# Patient Record
Sex: FEMALE | URBAN - METROPOLITAN AREA
[De-identification: names, ages, dates, MRNs, and addresses within clinical notes are randomized per-mention and may not be internally consistent; named-entity substitution may affect disease eponyms.]

---

## 2024-10-23 ENCOUNTER — TELEPHONE (OUTPATIENT)
Dept: TRANSPLANT | Facility: CLINIC | Age: 69
End: 2024-10-23

## 2024-10-24 ENCOUNTER — TELEPHONE (OUTPATIENT)
Dept: TRANSPLANT | Facility: CLINIC | Age: 69
End: 2024-10-24

## 2024-10-30 ENCOUNTER — TELEPHONE (OUTPATIENT)
Dept: TRANSPLANT | Facility: CLINIC | Age: 69
End: 2024-10-30
Payer: MEDICARE

## 2024-11-26 ENCOUNTER — TELEPHONE (OUTPATIENT)
Dept: TRANSPLANT | Facility: CLINIC | Age: 69
End: 2024-11-26
Payer: MEDICARE

## 2024-12-09 ENCOUNTER — TELEPHONE (OUTPATIENT)
Dept: TRANSPLANT | Facility: CLINIC | Age: 69
End: 2024-12-09
Payer: MEDICARE

## 2024-12-16 ENCOUNTER — TELEPHONE (OUTPATIENT)
Dept: TRANSPLANT | Facility: CLINIC | Age: 69
End: 2024-12-16
Payer: MEDICARE

## 2024-12-20 ENCOUNTER — TELEPHONE (OUTPATIENT)
Dept: TRANSPLANT | Facility: CLINIC | Age: 69
End: 2024-12-20
Payer: MEDICARE

## 2024-12-30 DIAGNOSIS — Z76.82 ORGAN TRANSPLANT CANDIDATE: ICD-10-CM

## 2024-12-30 DIAGNOSIS — N18.6 END STAGE RENAL DISEASE: Primary | ICD-10-CM

## 2025-01-23 ENCOUNTER — TELEPHONE (OUTPATIENT)
Dept: TRANSPLANT | Facility: CLINIC | Age: 70
End: 2025-01-23
Payer: MEDICARE

## 2025-02-05 ENCOUNTER — TELEPHONE (OUTPATIENT)
Dept: TRANSPLANT | Facility: CLINIC | Age: 70
End: 2025-02-05
Payer: MEDICARE

## 2025-02-06 ENCOUNTER — HOSPITAL ENCOUNTER (OUTPATIENT)
Dept: RADIOLOGY | Facility: HOSPITAL | Age: 70
Discharge: HOME | End: 2025-02-06
Attending: FAMILY MEDICINE
Payer: MEDICARE

## 2025-02-06 ENCOUNTER — HOSPITAL ENCOUNTER (OUTPATIENT)
Dept: RADIOLOGY | Facility: HOSPITAL | Age: 70
Discharge: HOME | End: 2025-02-06
Payer: MEDICARE

## 2025-02-06 ENCOUNTER — OFFICE VISIT (OUTPATIENT)
Dept: TRANSPLANT | Facility: CLINIC | Age: 70
End: 2025-02-06
Payer: MEDICARE

## 2025-02-06 VITALS
DIASTOLIC BLOOD PRESSURE: 90 MMHG | RESPIRATION RATE: 18 BRPM | HEART RATE: 53 BPM | TEMPERATURE: 98 F | BODY MASS INDEX: 28.79 KG/M2 | OXYGEN SATURATION: 97 % | WEIGHT: 168.63 LBS | HEIGHT: 64 IN | SYSTOLIC BLOOD PRESSURE: 186 MMHG

## 2025-02-06 DIAGNOSIS — N26.1 ATROPHIC KIDNEY: ICD-10-CM

## 2025-02-06 DIAGNOSIS — I10 ESSENTIAL HYPERTENSION: ICD-10-CM

## 2025-02-06 DIAGNOSIS — N18.6 END STAGE RENAL DISEASE: ICD-10-CM

## 2025-02-06 DIAGNOSIS — Z76.82 ORGAN TRANSPLANT CANDIDATE: ICD-10-CM

## 2025-02-06 DIAGNOSIS — Z01.818 PRE-TRANSPLANT EVALUATION FOR KIDNEY TRANSPLANT: Primary | ICD-10-CM

## 2025-02-06 DIAGNOSIS — F17.210 CIGARETTE NICOTINE DEPENDENCE WITHOUT COMPLICATION: ICD-10-CM

## 2025-02-06 DIAGNOSIS — N18.5 CKD (CHRONIC KIDNEY DISEASE), STAGE V: ICD-10-CM

## 2025-02-06 PROBLEM — N25.81 SECONDARY HYPERPARATHYROIDISM: Status: ACTIVE | Noted: 2023-04-18

## 2025-02-06 PROBLEM — F17.200 NICOTINE DEPENDENCE: Status: ACTIVE | Noted: 2025-01-28

## 2025-02-06 PROCEDURE — 93978 VASCULAR STUDY: CPT | Mod: TC,TXP

## 2025-02-06 PROCEDURE — 76700 US EXAM ABDOM COMPLETE: CPT | Mod: TC,TXP

## 2025-02-06 PROCEDURE — 99999 PR PBB SHADOW E&M-EST. PATIENT-LVL V: CPT | Mod: PBBFAC,TXP,, | Performed by: NURSE PRACTITIONER

## 2025-02-06 PROCEDURE — 71046 X-RAY EXAM CHEST 2 VIEWS: CPT | Mod: 26,TXP,, | Performed by: RADIOLOGY

## 2025-02-06 PROCEDURE — 72192 CT PELVIS W/O DYE: CPT | Mod: TC,TXP

## 2025-02-06 PROCEDURE — 99204 OFFICE O/P NEW MOD 45 MIN: CPT | Mod: S$GLB,TXP,, | Performed by: PHYSICIAN ASSISTANT

## 2025-02-06 PROCEDURE — 76700 US EXAM ABDOM COMPLETE: CPT | Mod: 26,TXP,, | Performed by: RADIOLOGY

## 2025-02-06 PROCEDURE — 76770 US EXAM ABDO BACK WALL COMP: CPT | Mod: TC,TXP

## 2025-02-06 PROCEDURE — 72192 CT PELVIS W/O DYE: CPT | Mod: 26,TXP,, | Performed by: RADIOLOGY

## 2025-02-06 PROCEDURE — 99204 OFFICE O/P NEW MOD 45 MIN: CPT | Mod: S$GLB,TXP,, | Performed by: TRANSPLANT SURGERY

## 2025-02-06 PROCEDURE — 76770 US EXAM ABDO BACK WALL COMP: CPT | Mod: 26,TXP,, | Performed by: RADIOLOGY

## 2025-02-06 PROCEDURE — 93978 VASCULAR STUDY: CPT | Mod: 26,TXP,, | Performed by: RADIOLOGY

## 2025-02-06 PROCEDURE — 71046 X-RAY EXAM CHEST 2 VIEWS: CPT | Mod: TC,TXP

## 2025-02-06 RX ORDER — GABAPENTIN 300 MG/1
300 CAPSULE ORAL DAILY
COMMUNITY
Start: 2025-01-29

## 2025-02-06 RX ORDER — FUROSEMIDE 40 MG/1
40 TABLET ORAL DAILY PRN
COMMUNITY
Start: 2025-02-04

## 2025-02-06 RX ORDER — ASPIRIN 81 MG/1
1 TABLET ORAL DAILY
COMMUNITY

## 2025-02-06 RX ORDER — ACETAMINOPHEN 500 MG
500 TABLET ORAL
COMMUNITY

## 2025-02-06 RX ORDER — SODIUM BICARBONATE 650 MG/1
650 TABLET ORAL 2 TIMES DAILY
COMMUNITY
Start: 2025-01-25

## 2025-02-06 RX ORDER — CARVEDILOL 6.25 MG/1
1 TABLET ORAL 2 TIMES DAILY
COMMUNITY
Start: 2025-01-28

## 2025-02-06 RX ORDER — ALLOPURINOL 100 MG/1
2 TABLET ORAL DAILY
COMMUNITY
Start: 2025-01-28

## 2025-02-06 RX ORDER — HYDRALAZINE HYDROCHLORIDE 25 MG/1
25 TABLET, FILM COATED ORAL EVERY 12 HOURS
COMMUNITY
Start: 2024-10-16

## 2025-02-06 RX ORDER — LORATADINE 10 MG/1
10 TABLET ORAL DAILY PRN
COMMUNITY

## 2025-02-06 RX ORDER — ACYCLOVIR 800 MG/1
400 TABLET ORAL 3 TIMES DAILY
COMMUNITY
Start: 2024-10-23

## 2025-02-06 RX ORDER — PHENAZOPYRIDINE HYDROCHLORIDE 95 MG/1
95 TABLET ORAL 3 TIMES DAILY PRN
COMMUNITY

## 2025-02-06 RX ORDER — ACYCLOVIR 400 MG/1
400 TABLET ORAL 2 TIMES DAILY
COMMUNITY
Start: 2024-07-29

## 2025-02-06 RX ORDER — BUPROPION HYDROCHLORIDE 150 MG/1
150 TABLET, FILM COATED ORAL DAILY
COMMUNITY
Start: 2025-01-29

## 2025-02-06 RX ORDER — LOSARTAN POTASSIUM 25 MG/1
25 TABLET ORAL 2 TIMES DAILY
COMMUNITY
Start: 2024-10-16

## 2025-02-06 RX ORDER — LOPERAMIDE HYDROCHLORIDE 2 MG/1
2 CAPSULE ORAL DAILY
COMMUNITY

## 2025-02-06 RX ORDER — CALCITRIOL 0.25 UG/1
0.25 CAPSULE ORAL DAILY
COMMUNITY
Start: 2024-10-23

## 2025-02-06 RX ORDER — ATORVASTATIN CALCIUM 10 MG/1
10 TABLET, FILM COATED ORAL DAILY
COMMUNITY
Start: 2025-01-29

## 2025-02-06 NOTE — PROGRESS NOTES
PRE-TRANSPLANT INFECTIOUS DISEASE CONSULT    Reason for Visit:  Pre-transplant evaluation  Referring Provider: Dr. Smith Attending     History of Present Illness:    70 y.o. female with a history of kidney disease presents for pre-kidney transplant evaluation. Patient is pre-dialysis.    Infectious History:  Recent hospital admissions: No  Recent infections: Yes  Recent or current antibiotic use: Yes - UTI 3 weeks ago  History of recurrent infections *(sinus / pneumonia / UTI / SBP): UTIs twice a year  History of diabetic foot wound or bone/joint infection: No  Recent dental infections, issues or procedures: No  History of chicken pox: Yes  History of shingles: No  History of STI: Yes - herpes - taking acyclovir  History of COVID infection: No  HCV cured with harvoni 2018    History of Immunosuppression:  Prior chemotherapy / immunosuppression: No  Prior transplant: No  History of splenectomy: No    Tuberculosis:  Prior screening for latent TB: No  Prior diagnosis of latent TB: No  Risk factors for TB *known exposure, incarceration, homelessness*: No    Geographical exposures:  Currently lives in [place], [place] with partner  Lived in the following states: [place], [place], [place]  Lived or travelled to the [facility] [place]: No  International travel: Yes - [place]   Travel-associated illness: Yes    Social/Environmental:  Occupation: Retired. 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  Yes  Tobacco: Yes  Alcohol: Yes  Recreational drug use:  No  Sexual partners: Yes      Past Histories:   Past Medical History:   Diagnosis Date    Acidosis     Allergy     Anemia     Atrophic kidney     Depression     Gout, unspecified     Hepatitis C virus infection cured after antiviral drug therapy     Hyperlipidemia     Hypertension     Proteinuria     Secondary  hyperparathyroidism     Tobacco dependence      Past Surgical History:   Procedure Laterality Date    COLONOSCOPY      ESOPHAGOGASTRODUODENOSCOPY      TUBAL LIGATION       Family History   Problem Relation Name Age of Onset    Hypertension Mother      Diabetes Mother      Heart disease Mother      Kidney disease Mother      Heart disease Father      Cancer Father       Social History     Tobacco Use    Smoking status: Every Day     Current packs/day: 0.50     Types: Cigarettes    Smokeless tobacco: Never   Substance Use Topics    Alcohol use: Yes     Comment: 1 drink daily    Drug use: Never     Review of patient's allergies indicates:  No Known Allergies      Current antibiotics:  Antibiotics (From admission, onward)      None              Review of Systems  Review of Systems   Constitutional: Negative for chills, fever, malaise/fatigue and night sweats.   HENT:  Negative for congestion and sore throat.    Eyes:  Negative for blurred vision and visual disturbance.   Cardiovascular:  Negative for chest pain and leg swelling.   Respiratory:  Negative for cough, shortness of breath and sputum production.    Skin:  Negative for color change, dry skin and itching.   Musculoskeletal:  Negative for back pain, joint pain and joint swelling.   Gastrointestinal:  Negative for abdominal pain, diarrhea, heartburn, nausea and vomiting.   Genitourinary:  Negative for dysuria, flank pain and hematuria.   Neurological:  Negative for dizziness, numbness and weakness.   Psychiatric/Behavioral:  Negative for altered mental status and depression. The patient is not nervous/anxious.           Objective  Physical Exam  Constitutional:       General: She is not in acute distress.     Appearance: Normal appearance. She is well-developed. She is not ill-appearing or diaphoretic.   HENT:      Head: Normocephalic and atraumatic.      Right Ear: External ear normal.      Left Ear: External ear normal.      Nose: Nose normal.   Eyes:       "General: No scleral icterus.        Right eye: No discharge.         Left eye: No discharge.      Extraocular Movements: Extraocular movements intact.      Conjunctiva/sclera: Conjunctivae normal.   Pulmonary:      Effort: Pulmonary effort is normal. No respiratory distress.      Breath sounds: No stridor.   Musculoskeletal:         General: Normal range of motion.   Skin:     Findings: No erythema or rash.   Neurological:      General: No focal deficit present.      Mental Status: She is alert and oriented to person, place, and time. Mental status is at baseline.      Cranial Nerves: No cranial nerve deficit.   Psychiatric:         Mood and Affect: Mood normal.         Behavior: Behavior normal.         Thought Content: Thought content normal.         Judgment: Judgment normal.           Labs:    CBC:   Lab Results   Component Value Date    WBC 9.42 02/06/2025    HGB 12.9 02/06/2025    HCT 39.4 02/06/2025     (H) 02/06/2025     02/06/2025    GRAN 6.4 02/06/2025    GRAN 68.3 02/06/2025    LYMPH 1.8 02/06/2025    LYMPH 19.5 02/06/2025    MONO 0.7 02/06/2025    MONO 6.9 02/06/2025    EOSINOPHIL 4.1 02/06/2025       Syphilis screening:   Lab Results   Component Value Date    TREPABIGMIGG Nonreactive 02/06/2025        TB screening: No results found for: "TBGOLDPLUS", "TSPOTSCREN"    HIV screening:   Lab Results   Component Value Date    ZXA41HUCA Non-reactive 02/06/2025       Strongyloides IgG: No results found for: "STRONGANTIGG"    Hepatitis Serologies:   Lab Results   Component Value Date    HEPAIGG Non-reactive 02/06/2025    HEPBCAB Non-reactive 02/06/2025    HEPBSAB <3.00 02/06/2025    HEPBSAB Non-reactive 02/06/2025    HEPCAB Reactive (A) 02/06/2025        Varicella IgG: No results found for: "VARICELLAINT"    Immunization History:  Received all childhood vaccines: Yes  All household members receive annual flu vaccine: No  All household members are up to date on COVID vaccine: No  Immunization " History   Administered Date(s) Administered    COVID-19 Vaccine 05/10/2021, 11/18/2021    Influenza 12/03/2018, 01/08/2020, 11/17/2022, 01/23/2023    Influenza - Trivalent - Fluzone High Dose - PF (65 years and older) 11/08/2016, 03/12/2018    Pneumococcal 07/22/2020    Pneumococcal Polysaccharide - 23 Valent 07/22/2020    Zoster 05/14/2018, 07/27/2018    Zoster Recombinant 05/14/2018, 07/27/2018   Tdap - unsure  Hep A/B - never  RSV - never    Assessment and Plan    1. Risks of Infection: Available serologies were reviewed. No unusual risks of infection or significant barriers to transplantation were identified from the infectious disease standpoint given the information available at this time.    - Acute infectious issues: None   - Pending serologies: Quantiferon gold / T-spot, Strongyloides IgG, and VZV IgG   - Please call if any pending serologic testing is positive.    2. Immunizations:  Based on the patient's immunization history and serologies, the following immunizations are recommended:  - Hepatitis A    Patient does not have immunity to hepatitis A    Vaccination ordered today: Yes   - Hepatitis B    Patient does not have immunity to hepatitis B    Vaccination ordered today: Yes   - COVID    Current CDC vaccination recommendations were discussed with the patient   - Annual high dose influenza     Vaccination ordered today: Yes   - Prevnar 20    Vaccination ordered today: Yes   - Tdap    Vaccination ordered today: Yes   - Shingrix    Vaccination ordered today: No. Reason for not ordering: Vaccination up to date    Recommended Pre-Transplant Immunization Schedule   Vaccine  0m 1m 2m 6m   Pneumococcal conjugate vaccine (Prevnar 20) X      Tetanus-diphtheria-pertussis (Tdap)* X      Hepatitis A Vaccine (Havrix)** X   X   Hepatitis B Vaccine (Heplisav)** X X     Influenza (annual) X      Zoster Recombinant Vaccine (Shingrix) X  X           *Administer booster every 10 years.       **Administer if no immunity  demonstrated on serologies               Patient will receive vaccines at local pharmacy. A written prescription was provided for all vaccine doses.     3. Counseling:   I discussed with the patient the risk for increased susceptibility to infections following transplantation including increased risk for infection right after transplant and if rejection should occur.  The patient has been counseled on the importance of vaccinations to decrease risk of infection and severe illness. Specific guidance has been provided to the patient regarding the patient's occupation, hobbies and activities to avoid future infectious complications.     4. Transplant Candidacy: Based on available information, there are no identified significant barriers to transplantation from an infectious disease standpoint.  Final determination of transplant candidacy will be made once evaluation is complete and reviewed by the Selection Committee.      Follow up with infectious disease as needed.       The total time for evaluation and management services performed on 02/06/2025 was greater than 45 minutes.

## 2025-02-06 NOTE — LETTER
February 12, 2025        Che Phelps  [address] [place] [address]  Phone: [phone]  Fax: [phone]             [facility] 1st Fl  [address]  [facility] [address]  Phone: [phone]   Patient: Arlette Christian   MR Number: [id number]   YOB: 1955   Date of Visit: 2/6/2025       Dear Dr. Che Phelps    Thank you for referring Arlette Christian to me for evaluation. Attached you will find relevant portions of my assessment and plan of care.    If you have questions, please do not hesitate to call me. I look forward to following Arlette Christian along with you.    Sincerely,    Jayshree Rome NP    Enclosure    If you would like to receive this communication electronically, please contact [email or web address] or [phone] to request [employer] access.    [employer] is a tool which provides read-only access to select patient information with whom you have a relationship. Its easy to use and provides real time access to review your patients record including encounter summaries, notes, results, and demographic information.    If you feel you have received this communication in error or would no longer like to receive these types of communications, please e-mail [email or web address]

## 2025-02-06 NOTE — PROGRESS NOTES
PHARM.D. PRE-TRANSPLANT NOTE:    This patient's medication therapy was evaluated as part of her pre-transplant evaluation.      The following general pharmacologic concerns were noted: aspirin may increase the risk of perioperative bleeding; resume ACV once CMV ppx period is completed      The following concerns for post-operative pain management were noted: none    The following pharmacologic concerns related to HCV therapy were noted: none      This patient's medication profile was reviewed for considerations for DAA Hepatitis C therapy:    [x]  No current inducers of CYP 3A4 or PGP  [x]  No amiodarone on this patient's EMR profile in the last 24 months  [x]  No past or current atrial fibrillation on this patient's EMR profile       Current Outpatient Medications   Medication Sig Dispense Refill    acetaminophen (TYLENOL) 500 MG tablet Take 500 mg by mouth as needed for Pain.      acyclovir (ZOVIRAX) 400 MG tablet Take 400 mg by mouth 2 (two) times daily.      acyclovir (ZOVIRAX) 800 MG Tab Take 400 mg by mouth 3 (three) times daily. For 2 days with onset of lesion      allopurinoL (ZYLOPRIM) 100 MG tablet Take 2 tablets by mouth once daily.      aspirin (ECOTRIN) 81 MG EC tablet Take 1 tablet by mouth once daily.      atorvastatin (LIPITOR) 10 MG tablet Take 10 mg by mouth once daily.      calcitRIOL (ROCALTROL) 0.25 MCG Cap Take 0.25 mcg by mouth once daily.      calcium carbonate/vitamin D3 (CALCIUM+D ORAL) Take 1,200 mg by mouth 2 (two) times a day.      carvediloL (COREG) 6.25 MG tablet Take 1 tablet by mouth 2 (two) times daily.      furosemide (LASIX) 40 MG tablet Take 40 mg by mouth daily as needed (swelling).      gabapentin (NEURONTIN) 300 MG capsule Take 300 mg by mouth once daily.      hydrALAZINE (APRESOLINE) 25 MG tablet Take 25 mg by mouth every 12 (twelve) hours.      loperamide (IMODIUM) 2 mg capsule Take 2 mg by mouth once daily.      loratadine (CLARITIN) 10 mg tablet Take 10 mg by mouth daily  as needed for Allergies.      losartan (COZAAR) 25 MG tablet Take 25 mg by mouth 2 (two) times a day.      phenazopyridine (PYRIDIUM) 95 MG tablet Take 95 mg by mouth 3 (three) times daily as needed for Pain.      sodium bicarbonate 650 MG tablet Take 650 mg by mouth 2 (two) times daily.      WELLBUTRIN  mg TBSR 12 hr tablet Take 150 mg by mouth once daily.       No current facility-administered medications for this visit.           I am available for consultation and can be contacted, as needed by the other members of the Transplant team.

## 2025-02-06 NOTE — PROGRESS NOTES
Transplant Surgery  Kidney Transplant Recipient Evaluation    Referring Physician: Che Phelps  Current Nephrologist: Che Phelps    Subjective:     Reason for Visit: evaluate transplant candidacy    History of Present Illness: Arlette Christian is a 70 y.o. year old female undergoing transplant evaluation.    Dialysis History: Arlette is pre-dialysis.      Transplant History: N/A    Etiology of Renal Disease: Hypertensive Nephrosclerosis (based on medical records from referral).    External provider notes reviewed: Yes    Review of Systems   Constitutional:  Negative for chills and fever.   HENT:  Negative for facial swelling and sore throat.    Eyes:  Negative for redness and itching.   Respiratory:  Negative for cough and shortness of breath.    Cardiovascular:  Negative for chest pain and leg swelling.   Gastrointestinal:  Negative for abdominal distention and abdominal pain.   Endocrine: Negative for polydipsia and polyphagia.   Genitourinary:  Negative for dysuria and hematuria.   Musculoskeletal:  Negative for back pain and myalgias.   Skin:  Negative for pallor and rash.   Allergic/Immunologic: Negative for environmental allergies and immunocompromised state.   Neurological:  Negative for light-headedness and headaches.   Hematological:  Negative for adenopathy. Does not bruise/bleed easily.   Psychiatric/Behavioral:  Negative for agitation and confusion.      Objective:     Physical Exam:  Constitutional:   Vitals reviewed: yes   Well-nourished and well-groomed: yes  Eyes:   Sclerae icteric: no   Extraocular movements intact: yes  GI:    Bowel sounds normal: yes   Tenderness: no    If yes, quadrant/location: not applicable   Palpable masses: no    If yes, quadrant/location: not applicable   Hepatosplenomegaly: no   Ascites: no   Hernia: no    If yes, type/location: not applicable   Surgical scars: no    If yes, type/location: not applicable  Resp:   Effort normal: yes   Breath sounds normal:  yes    CV:   Regular rate and rhythm: yes   Heart sounds normal: yes   Femoral pulses normal: yes   Extremities edematous: no  Skin:   Rashes or lesions present: no    If yes, describe:not applicable   Jaundice:: no    Musculoskeletal:   Gait normal: yes   Strength normal: yes  Psych:   Oriented to person, place, and time: yes   Affect and mood normal: yes    Additional comments: not applicable    Diagnostics:  The following labs have been reviewed: CBC  BMP  The following radiology images have been independently reviewed and interpreted:     Counseling: We provided Arlette Christian with a group education session today.  We discussed kidney transplantation at length with her, including risks, potential complications, and alternatives in the management of her renal failure.  The discussion included complications related to anesthesia, bleeding, infection, primary nonfunction, and ATN.  I discussed the typical postoperative course, length of hospitalization, the need for long-term immunosuppression, and the need for long-term routine follow-up.  I discussed living-donor and -donor transplantation and the relative advantages and disadvantages of each.  I also discussed average waiting times for both living donation and  donation.  I discussed national and center-specific survival rates.  I also mentioned the potential benefit of multicenter listing to candidates listed with centers within more than one organ procurement organization.  All questions were answered.    Patient advised that it is recommended that all transplant candidates, and their close contacts and household members receive Covid vaccination.    Final determination of transplant candidacy will be made once evaluation is complete and reviewed by the Kidney & Kidney/Pancreas Selection Committee.    Coronavirus disease (COVID-19) caused by severe acute respiratory virus coronavirus 2 (SARS-C0V 2) is associated with increased mortality in solid  organ transplant recipients (SOT) compared to non-transplant patients. Vaccine responses to vaccination are depressed against SARS-CoV2 compared to normal individuals but improve with third vaccination doses. Vaccination prior to SOT provides both the best opportunity for transplant candidates to develop protective immunity and to reduce the risk of serious COVID19 infections post transplantation. Organ transplant candidates at [facility] will be required to receive SARS-CoV-2 vaccination prior to being listed with a an active status, whenever possible. Exceptions will be made for disability related reasons or for sincerely held [employer] beliefs.          Transplant Surgery - Candidacy   Assessment/Plan:   Arlette Christian is pre-dialysis with CKD stage 4 (GFR 15-29 mL/min). I have concerns with her history of smoking. Based on available information, Arlette Christian is a suitable kidney transplant candidate pending imaging studies. I emphasized the importance of smoking cessation, disclosed the risk of prohibitive vascular calcifications on imaging studies. She is currently in a smoking cessation program.    Additional testing to be completed according to the Written Order Guidelines for Adult Pre-kidney and Pancreas Transplant Evaluation (KI-02).  Interpretation of tests and discussion of patient management involves all members of the multidisciplinary transplant team.    Wood Cuevas MD

## 2025-02-06 NOTE — PROGRESS NOTES
Transplant Recipient Adult Psychosocial Assessment    [address] [place] [address]  Telephone Information:   Mobile [phone]   Home  [phone] (home)  Work  There is no work phone number on file.  E-mail  [email or web address]    Sex: female  YOB: 1955  Age: 70 y.o.    Encounter Date: 2025  [place] Citizen: yes  Primary Language: English   Needed: no    Emergency Contact:  Name: Alexis Cornejo   Relationship: significant other  Address: [facility] [address] [place] [address]  Phone Numbers:  [phone] (mobile)    The patient reported that she is being screened by [facility] in [place]. The patient reported that she is on [facility] inactive list due to smoking and has until 2025 to quit.     Family/Social Support:   Number of dependents/: The patient reported that she does not have any dependents at this time.   Marital history: The patient reported that she is a .  Other family dynamics: The patient's parents are . The patient has one sister Aditi Yee that resides in [place] and will assist her post transplant recovery. The patient has one son Jeremiah Mccloud that lives with [place].     Household Composition:  Name: Alexis Cornejo  Age: 76  Relationship: significant other  Does person drive? yes    Do you and your caregivers have access to reliable transportation? yes  PRIMARY CAREGIVER: Alexis Cornejo  will be primary caregiver, phone number [phone].      provided in-depth information to patient and caregiver regarding pre- and post-transplant caregiver role.   strongly encourages patient and caregiver to have concrete plan regarding post-transplant care giving, including back-up caregiver(s) to ensure care giving needs are met as needed.    Patient and Caregiver states understanding all aspects of caregiver role/commitment and is able/willing/committed to being caregiver to the fullest extent  necessary.    Patient and Caregiver verbalizes understanding of the education provided today and caregiver responsibilities.         remains available. Patient and Caregiver agree to contact  in a timely manner if concerns arise.      Able to take time off work without financial concerns: The patient's caregiver reported that he is retired and will not have financial concerns providing assistance to the patient post transplant recovery.     Additional Significant Others who will Assist with Transplant:  Name: Aditi Yee   Age: 74  State: [place]  Relationship: sister  Does person drive? yes      Living Will: no  Healthcare Power of : yes Jeremiah Mccloud (son)   Advance Directives on file: <<no information> per medical record.  Verbally reviewed LW/HCPA information.   provided patient with copy of LW/HCPA documents and provided education on completion of forms.    Living Donors: No.    Highest Education Level: High School (9-12) or GED  Reading Ability: 12th grade  Reports difficulty with:  The patient reported that she wears readers. The patient reported that sometimes she forgets things.   Learns Best By:  The patient reported she learns best by hands-on instructions.      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     Spouse/Significant Other Employment: Retired     Disabled: no    Monthly Income:  Salary/Wages: $2100  Able to afford all costs now and if transplanted, including medications: yes  Patient and Caregiver verbalizes understanding of personal responsibilities related to transplant costs and the importance of having a financial plan to ensure that patients transplant costs are fully covered.      provided fundraising information/education.  Patient and Caregiver verbalizes understanding.    remains available.    Insurance:   Payer/Plan Subscr  Sex Relation Sub. Ins. ID Effective Group Num   1. [facility]* MANUEL MIX ANN 1955 Female Self [id number]                                   [address]   2. [employer]* MANUEL MIX ANN 1955 Female Self [id number]                                    [address] [address]     Primary Insurance (for UNOS reporting): Public Insurance - Medicare & Choice  Secondary Insurance (for UNOS reporting): Public Insurance - Medicare & Choice  Patient and Caregiver verbalizes clear understanding that patient may experience difficulty obtaining and/or be denied insurance coverage post-surgery. This includes and is not limited to disability insurance, life insurance, health insurance, burial insurance, long term care insurance, and other insurances.    Patient and Caregiver also reports understanding that future health concerns related to or unrelated to transplantation may not be covered by patient's insurance.  Resources and information provided and reviewed.      Patient and Caregiver provides verbal permission to release any necessary information to outside resources for patient care and discharge planning.  Resources and information provided are reviewed.      Dialysis Adherence:  The patient is pre-dialysis at this time.     Infusion Service: patient utilizing? no  Home Health: patient utilizing? no  DME: yes [facility], thermometer    Pulmonary/Cardiac Rehab: no   ADLS:  The patient reported that she drives and completes her activities of daily living independently.     Adherence:   The patient reported that she adheres to her medical instructions and medication regiment.  Adherence education and counseling provided.     Per History Section:  No past medical history on file.  Social History     Tobacco Use    Smoking status: Not on file    Smokeless tobacco: Not on file   Substance Use Topics    Alcohol use: Not on file     Social History     Substance  and Sexual Activity   Drug Use Not on file     Social History     Substance and Sexual Activity   Sexual Activity Not on file       Per Today's Psychosocial:  Tobacco:  Yes, the patient reported that smokes a pack a day . The patient reported that she is trying to quit smoking but it is hard because she smoked over forty years. The patient reported that her PCP is currently prescribing her Wellbutrin to assist her with reducing her cravings to smoke.  Alcohol:  The patient reported that she drinks one a day but she is willing to quit if required .  Illicit Drugs/Non-prescribed Medications: none, patient denies any use.    Patient and Caregiver states clear understanding of the potential impact of substance use as it relates to transplant candidacy and is aware of possible random substance screening.  Substance abstinence/cessation counseling, education and resources provided and reviewed.     Arrests/DWI/Treatment/Rehab: patient denies    Psychiatric History:    Mental Health:  The patient denies a history of mental health diagnoses.   Psychiatrist/Counselor: The patient denies current or past treatment with a psychiatrist or counselor.   Medications:  The patient denies current or past use of psychotropic medications.   Suicide/Homicide Issues: The patient denies current or past si/hi issues.    Safety at home: The patient reported that she feels safe at home.     Knowledge: Patient and Caregiver states having clear understanding and realistic expectations regarding the potential risks and potential benefits of organ transplantation and organ donation, agrees to discuss with health care team members and support system members, and to utilize available resources and express questions and/or concerns in order to further facilitate the pt informed decision-making.  Resources and information provided and reviewed.     Patient and Caregiver is aware of [facility]'s affiliation and/or partnership with agencies in home  health care, LTAC, SNF, [facility], and other hospitals and clinics.    Understanding: Patient and Caregiver reports having a clear understanding of the many lifetime commitments involved with being a transplant recipient, including costs, compliance, medications, lab work, procedures, appointments, concrete and financial planning, preparedness, timely and appropriate communication of concerns, abstinence (ETOH, tobacco, illicit non-prescribed drugs), adherence to all health care team recommendations, support system and caregiver involvement, appropriate and timely resource utilization and follow-through, mental health counseling as needed/recommended, and patient and caregiver responsibilities.  Social Service Handbook, resources and detailed educational information provided and reviewed.  Educational information provided.    Patient and Caregiver also reports current and expected compliance with health care regime and states having a clear understanding of the importance of compliance.      Patient and Caregiver reports a clear understanding that risks and benefits may be involved with organ transplantation and with organ donation.      Patient and Caregiver also reports clear understanding that psychosocial risk factors may affect patient, and include but are not limited to feelings of depression, generalized anxiety, anxiety regarding dependence on others, post traumatic stress disorder, feelings of guilt and other emotional and/or mental concerns, and/or exacerbation of existing mental health concerns.  Detailed resources provided and discussed.     Patient and Caregiver agrees to access appropriate resources in a timely manner as needed and/or as recommended, and to communicate concerns appropriately.  Patient and Caregiver also reports a clear understanding of treatment options available.      reviewed education, provided additional information, and answered questions.    Feelings or Concerns: The  patient denies any concerns at this time.     Coping: Identify Patient & Caregiver Strategies to [place]:   1. In the past - traveling, spending time with significant other, watching tv, and working in the gardening.    2. Currently & Pre-transplant -  traveling, spending time with significant other, watching tv, and working in the gardening.   3. At the time of surgery - spending time with significant other and watching tv   4. During post-Transplant & Recovery Period - spending time with significant other and watching tv    Goals: The patient reported that her goal is to be healthy.  Patient referred to Vocational Rehabilitation.    Interview Behavior: Patient and Caregiver presents as alert and oriented x 4, pleasant, good eye contact, calm, communicative, cooperative, and asking and answering questions appropriately.          Transplant Social Work - Candidacy  Assessment/Plan:     Psychosocial Suitability: Patient presents as low to moderate candidate for kidney transplant at this time based on psychosocial risk factors. The patient reported that she is actively smoking a half pack of cigarettes a day. The patient reported that her PCP prescribed Wellbutrin about five days ago.  This SW encouraged smoking cessation to this patient. The patient has a caregiver plan in place. The patient reported that she has a mobile home which is going to be used for lodging post transplant recovery. The patient has adequate insurance coverage at this time.     Recommendations/Additional Comments: This SW recommends smoking cessation to assist this patient with her tobacco usage. SW recommends that pt conduct fundraising to assist pt with pay for cost of medications, food, gas, and other transplant related needs. SW recommends that pt remain aware of potential mental health concerns and contact the team if any concerns arise. SW recommends that pt abstinent from tobacco, ETOH, and drug use. SW remains available to answer any  questions or concerns that arise as the pt moves through the transplant process.     Final determination of transplant candidacy will be reviewed and determined by the selection committee    Greg Borrero LCSW

## 2025-02-06 NOTE — PROGRESS NOTES
TRANSPLANT NUTRITIONAL ASSESSMENT    Referring Provider: Che Phelps     Reason for Visit: Pre-kidney transplant work-up (pre-dialysis)    Age: 70 y.o.  Sex: female    Patient Active Problem List   Diagnosis    Essential hypertension    Nicotine dependence    Secondary hyperparathyroidism     Past Medical History:   Diagnosis Date    Acidosis     Allergy     Anemia     Atrophic kidney     Depression     Gout, unspecified     Hepatitis C virus infection cured after antiviral drug therapy     Hyperlipidemia     Hypertension     Proteinuria     Secondary hyperparathyroidism     Tobacco dependence      Lab Results   Component Value Date    GLU 95 02/06/2025    K 4.3 02/06/2025    PHOS 4.2 02/06/2025    CHOL 126 02/06/2025    HDL 53 02/06/2025    TRIG 105 02/06/2025    ALBUMIN 3.2 (L) 02/06/2025    CALCIUM 9.4 02/06/2025     Other Pertinent Labs: N/A    Current Outpatient Medications   Medication Sig    acetaminophen (TYLENOL) 500 MG tablet Take 500 mg by mouth as needed for Pain.    acyclovir (ZOVIRAX) 400 MG tablet Take 400 mg by mouth 2 (two) times daily.    acyclovir (ZOVIRAX) 800 MG Tab Take 400 mg by mouth 3 (three) times daily. For 2 days with onset of lesion    allopurinoL (ZYLOPRIM) 100 MG tablet Take 2 tablets by mouth once daily.    aspirin (ECOTRIN) 81 MG EC tablet Take 1 tablet by mouth once daily.    atorvastatin (LIPITOR) 10 MG tablet Take 10 mg by mouth once daily.    calcitRIOL (ROCALTROL) 0.25 MCG Cap Take 0.25 mcg by mouth once daily.    calcium carbonate/vitamin D3 (CALCIUM+D ORAL) Take 1,200 mg by mouth 2 (two) times a day.    carvediloL (COREG) 6.25 MG tablet Take 1 tablet by mouth 2 (two) times daily.    furosemide (LASIX) 40 MG tablet Take 40 mg by mouth daily as needed (swelling).    gabapentin (NEURONTIN) 300 MG capsule Take 300 mg by mouth once daily.    hydrALAZINE (APRESOLINE) 25 MG tablet Take 25 mg by mouth every 12 (twelve) hours.    loperamide (IMODIUM) 2 mg capsule Take 2 mg by mouth  "once daily.    loratadine (CLARITIN) 10 mg tablet Take 10 mg by mouth daily as needed for Allergies.    losartan (COZAAR) 25 MG tablet Take 25 mg by mouth 2 (two) times a day.    phenazopyridine (PYRIDIUM) 95 MG tablet Take 95 mg by mouth 3 (three) times daily as needed for Pain.    sodium bicarbonate 650 MG tablet Take 650 mg by mouth 2 (two) times daily.    WELLBUTRIN  mg TBSR 12 hr tablet Take 150 mg by mouth once daily.     No current facility-administered medications for this visit.     Allergies: Patient has no known allergies.    Ht Readings from Last 1 Encounters:   02/06/25 5' 3.9" (1.623 m)     Wt Readings from Last 1 Encounters:   02/06/25 76.5 kg (168 lb 10.4 oz)      BMI: Body mass index is 29.04 kg/m².    Usual Weight: 167 lb   Weight Change/Time: stable   Current Diet: regular   Appetite/Current Intake: fair; altered taste   Exercise/Physical Activity: functional in ADLs   Nutritional/Herbal Supplements: calcium with D3  Chewing/Swallowing Problems: none  Symptoms: diarrhea, relieved with medications     Estimated Kcal Need: 1913 kcal/day (25 kcal/kg)   Estimated Protein Need: 46-61 gm/day (0.6-0.8 gm/kg)     Nutritional History:   Pt and caregiver present. Pt consumes vegetables daily.     Diet Recall    Morning: sausage and biscuit or frosted flakes with no milk     Midday: skips    Evening: typically meat (beef, chicken, pork), side salad (lettuce, bell pepper, onion, cheese, Ranch or Italian dressing), and a vegetable (broccoli, spinach, cauliflower, cabbage); homemade soup and sometimes canned soup     Snacks: saltine crackers, chips, grapes and nut package, 1/2 cup check mix     Desserts: none     Beverages: ~40 oz water/day, black coffee x 2/day, 12 oz vodka + white grape juice 1x/day       Seasonings: typically nothing or lemon pepper and low sodium Sarabjit's seasoning blend     Restaurants/Fast Food: 1x/month     Nutritional Diagnoses  Problem: food- and nutrition-related knowledge " deficit  Etiology: RT lack of previous education on pre-kidney transplant nutrition recommendations  Symptoms: AEB diet recall and questions from pt     Educational Need? yes  Barriers: none identified  Discussed with: patient and caregiver  Interventions: Patient taught nutrition information regarding Pre-kidney transplant work-up (pre-dialysis). Renal Nutrition Therapy packet reviewed (high/low food sources of K, Phos and protein, low sodium and fluid intake, emphasis on moderate protein intake). Encouraged physical activity daily, regular exercise as tolerated, stay mobile.   Goals/Recommendations: diet adherence  Actions Taken: instruct/provide written information  Patient and/or family comprehend instructions: yes  Outcome: Verbalizes understanding  Monitoring: Contact information provided, will f/u in clinic and communicate with the care team as needed.     Counseling Time: 20 minutes

## 2025-02-06 NOTE — PROGRESS NOTES
Transplant Nephrology  Kidney Transplant Recipient Evaluation    Referring Physician: Che Phelps  Current Nephrologist: Che Phelps    Subjective:   CC:  Initial evaluation of kidney transplant candidacy.    HPI:  Ms. Christian is a 70 y.o. year old White female who has presented to be evaluated as a potential kidney transplant recipient.  She has advanced kidney disease secondary to HTN and atrophic kidney .  Patient is currently pre-dialysis, does not have a dialysis access.     Progressive CKD since her 50s due to HTN and atropic right kidney. Her mother had ESRD and kidney transplant but she is unsure of cause of her kidney disease. During renal work up found to have positive THERON. Saw rheumatology who felt she did not have SLE. She is currently going through transplant evaluation at [place] as well.     HCV cured with harvoni 2018. Denies cirrhosis. Currently smoking 1/2 PPD cigarettes, recently started on Wellbutrin to aid with smoking cessation.     She is retired but remains active. Uses no assistive devices. Maintains her home-cooking, cleaning, laundry. Not frail. Currently no potential living donors.    Denies MI, CVA, DVT/PE, or malignancy history.     Current Outpatient Medications   Medication Sig Dispense Refill    acetaminophen (TYLENOL) 500 MG tablet Take 500 mg by mouth as needed for Pain.      acyclovir (ZOVIRAX) 400 MG tablet Take 400 mg by mouth 2 (two) times daily.      acyclovir (ZOVIRAX) 800 MG Tab Take 400 mg by mouth 3 (three) times daily. For 2 days with onset of lesion      allopurinoL (ZYLOPRIM) 100 MG tablet Take 2 tablets by mouth once daily.      aspirin (ECOTRIN) 81 MG EC tablet Take 1 tablet by mouth once daily.      atorvastatin (LIPITOR) 10 MG tablet Take 10 mg by mouth once daily.      calcitRIOL (ROCALTROL) 0.25 MCG Cap Take 0.25 mcg by mouth once daily.      calcium carbonate/vitamin D3 (CALCIUM+D ORAL) Take 1,200 mg by mouth 2 (two) times a day.      carvediloL  (COREG) 6.25 MG tablet Take 1 tablet by mouth 2 (two) times daily.      furosemide (LASIX) 40 MG tablet Take 40 mg by mouth daily as needed (swelling).      gabapentin (NEURONTIN) 300 MG capsule Take 300 mg by mouth once daily.      hydrALAZINE (APRESOLINE) 25 MG tablet Take 25 mg by mouth every 12 (twelve) hours.      loperamide (IMODIUM) 2 mg capsule Take 2 mg by mouth once daily.      loratadine (CLARITIN) 10 mg tablet Take 10 mg by mouth daily as needed for Allergies.      losartan (COZAAR) 25 MG tablet Take 25 mg by mouth 2 (two) times a day.      phenazopyridine (PYRIDIUM) 95 MG tablet Take 95 mg by mouth 3 (three) times daily as needed for Pain.      sodium bicarbonate 650 MG tablet Take 650 mg by mouth 2 (two) times daily.      WELLBUTRIN  mg TBSR 12 hr tablet Take 150 mg by mouth once daily.       No current facility-administered medications for this visit.       Past Medical History:   Diagnosis Date    Acidosis     Allergy     Anemia     Atrophic kidney     Depression     Gout, unspecified     Hepatitis C virus infection cured after antiviral drug therapy     Hyperlipidemia     Hypertension     Proteinuria     Secondary hyperparathyroidism     Tobacco dependence      Past Medical and Surgical History: Ms. Christian  has a past medical history of Acidosis, Allergy, Anemia, Atrophic kidney, Depression, Gout, unspecified, Hepatitis C virus infection cured after antiviral drug therapy, Hyperlipidemia, Hypertension, Proteinuria, Secondary hyperparathyroidism, and Tobacco dependence.  She has a past surgical history that includes Colonoscopy; Esophagogastroduodenoscopy; and Tubal ligation.    Past Social and Family History: Ms. Christian reports that she has been smoking cigarettes. She has never used smokeless tobacco. She reports current alcohol use. She reports that she does not use drugs. Her family history includes Cancer in her father; Diabetes in her mother; Heart disease in her father and mother;  "Hypertension in her mother; Kidney disease in her mother.    Review of Systems   Constitutional:  Positive for fatigue. Negative for activity change and fever.   Eyes:  Negative for visual disturbance.   Respiratory:  Negative for shortness of breath.    Cardiovascular:  Negative for chest pain and leg swelling.   Gastrointestinal:  Negative for constipation, diarrhea and nausea.   Genitourinary:  Negative for difficulty urinating, frequency and hematuria.   Musculoskeletal:  Negative for arthralgias and myalgias.   Skin:  Negative for wound.   Neurological:  Negative for weakness and numbness.   Psychiatric/Behavioral:  Negative for sleep disturbance. The patient is not nervous/anxious.      Objective:   Blood pressure (!) 186/90, pulse (!) 53, temperature 97.7 °F (36.5 °C), temperature source Tympanic, resp. rate 18, height 5' 3.9" (1.623 m), weight 76.5 kg (168 lb 10.4 oz), SpO2 97%.body mass index is 29.04 kg/m².    Physical Exam  Vitals and nursing note reviewed.   Constitutional:       Appearance: Normal appearance.   Cardiovascular:      Rate and Rhythm: Normal rate and regular rhythm.      Heart sounds: Normal heart sounds.   Pulmonary:      Effort: Pulmonary effort is normal.      Breath sounds: Normal breath sounds.   Abdominal:      General: There is no distension.   Musculoskeletal:         General: Normal range of motion.   Skin:     General: Skin is warm and dry.   Neurological:      Mental Status: She is alert.       Labs:  Lab Results   Component Value Date    WBC 9.42 02/06/2025    HGB 12.9 02/06/2025    HCT 39.4 02/06/2025     02/06/2025    K 4.3 02/06/2025     02/06/2025    CO2 23 02/06/2025    BUN 52 (H) 02/06/2025    CREATININE 3.4 (H) 02/06/2025    EGFRNORACEVR 14.1 (A) 02/06/2025    CALCIUM 9.4 02/06/2025    PHOS 4.2 02/06/2025    ALBUMIN 3.2 (L) 02/06/2025    AST 18 02/06/2025    ALT 11 02/06/2025    .0 (H) 02/06/2025     Labs were reviewed with the patient.    Assessment: "     1. Pre-transplant evaluation for kidney transplant    2. CKD (chronic kidney disease), stage V    3. Essential hypertension    4. Atrophic kidney    5. Cigarette nicotine dependence without complication    6. BMI 29.0-29.9,adult      Plan:   70 y.o. year old White female who has presented to be evaluated as a potential kidney transplant recipient.  She has advanced kidney disease secondary to HTN and atrophic kidney.  Patient is currently pre-dialysis. Will need afternoon imaging, updated pap and MMG, colonoscopy records. Will get testing from local cardiology as well as [facility]. Needs low dose CT chest for smoking history, encouraged continued smoking cessation.       Transplant Candidacy:   Based on available information, Ms. Christian is a suitable kidney transplant candidate.   Meets center eligibility for accepting HCV+ donor offer -  pending liver staging .  Patient educated on HCV+ donors. [facility] is willing to accept HCV+ donor offer - Yes   Patient is a candidate for KDPI > 85 kidney donor offer - Yes.  Final determination of transplant candidacy will be made once workup is complete and reviewed by the selection committee.    Patient advised that it is recommended that all transplant candidates, and their close contacts and household members receive Covid vaccination.    UNOS Patient Status  Functional Status: 90% - Able to carry on normal activity: minor symptoms of disease    Jayshree Rome NP

## 2025-02-16 ENCOUNTER — RESULTS FOLLOW-UP (OUTPATIENT)
Dept: TRANSPLANT | Facility: HOSPITAL | Age: 70
End: 2025-02-16

## 2025-02-27 ENCOUNTER — TELEPHONE (OUTPATIENT)
Dept: TRANSPLANT | Facility: CLINIC | Age: 70
End: 2025-02-27
Payer: MEDICARE

## 2025-03-17 ENCOUNTER — TELEPHONE (OUTPATIENT)
Dept: TRANSPLANT | Facility: CLINIC | Age: 70
End: 2025-03-17
Payer: MEDICARE

## 2025-03-17 NOTE — TELEPHONE ENCOUNTER
----- Message from Med Assistant Alexander sent at 3/14/2025  3:35 PM CDT -----  Regarding: FW: Return call  Contact: [phone]    ----- Message -----  From: Adriana Dave  Sent: 3/14/2025   2:50 PM CDT  To: [facility] Pre-Kidney Transplant Non-Clinical  Subject: Return call                                      Type:  Patient Returning CallWho Called:Arlette Doll Who Left Message for Patient:Camilla the patient know what this is regarding?:Kidney transplant Would the patient rather a call back or a response via [facility]? Call back Best Call Back Number:[phone] Information: Arlette Doll received the letter and needing clarification

## 2025-03-17 NOTE — TELEPHONE ENCOUNTER
Spoke w/pt regarding outside testing. Pt states that her cardiac f/u is 7/23/25. She believes that she's had this testing done last year also. I've requested all testing. Pt will reach out to have records faxed as well.

## 2025-03-20 ENCOUNTER — TELEPHONE (OUTPATIENT)
Dept: TRANSPLANT | Facility: CLINIC | Age: 70
End: 2025-03-20
Payer: MEDICARE

## 2025-03-20 NOTE — TELEPHONE ENCOUNTER
"----- Message from Enrrique sent at 3/20/2025  1:16 PM CDT -----  Consult/AdvisoryName Of Caller: SelfContact Preference?: [phone] What is the nature of the call?: Calling to speak w/ Iqra about letters received Additional Notes:"Thank you for all that you do for our patients"  "

## 2025-03-20 NOTE — TELEPHONE ENCOUNTER
Spoke w/pt regarding outside testing. I informed pt that we've received her colon and ABO records. Pt verbalized understanding.

## 2025-07-16 ENCOUNTER — TELEPHONE (OUTPATIENT)
Dept: TRANSPLANT | Facility: CLINIC | Age: 70
End: 2025-07-16
Payer: MEDICARE

## 2025-07-16 ENCOUNTER — PATIENT MESSAGE (OUTPATIENT)
Dept: TRANSPLANT | Facility: CLINIC | Age: 70
End: 2025-07-16
Payer: MEDICARE

## 2025-07-16 NOTE — TELEPHONE ENCOUNTER
I returned patient's call re: cardiac testing completed. No answer, I left a message with my call back number. No details on where patient had testing done.

## 2025-07-18 ENCOUNTER — TELEPHONE (OUTPATIENT)
Dept: TRANSPLANT | Facility: CLINIC | Age: 70
End: 2025-07-18
Payer: MEDICARE

## 2025-07-25 ENCOUNTER — TELEPHONE (OUTPATIENT)
Dept: TRANSPLANT | Facility: CLINIC | Age: 70
End: 2025-07-25
Payer: MEDICARE

## 2025-07-25 NOTE — TELEPHONE ENCOUNTER
Patient stress test and echo [facility] [place], [place] and cardiology Dr. Nichelle Dia. Message routed to via to KRISTIN Escalona.

## 2025-08-11 ENCOUNTER — TELEPHONE (OUTPATIENT)
Dept: TRANSPLANT | Facility: CLINIC | Age: 70
End: 2025-08-11
Payer: MEDICARE

## 2025-08-12 ENCOUNTER — TELEPHONE (OUTPATIENT)
Dept: TRANSPLANT | Facility: CLINIC | Age: 70
End: 2025-08-12
Payer: MEDICARE

## 2025-08-13 ENCOUNTER — TELEPHONE (OUTPATIENT)
Dept: TRANSPLANT | Facility: CLINIC | Age: 70
End: 2025-08-13
Payer: MEDICARE

## 2025-08-15 ENCOUNTER — COMMITTEE REVIEW (OUTPATIENT)
Dept: TRANSPLANT | Facility: CLINIC | Age: 70
End: 2025-08-15
Payer: MEDICARE

## 2025-08-15 ENCOUNTER — EPISODE CHANGES (OUTPATIENT)
Dept: TRANSPLANT | Facility: HOSPITAL | Age: 70
End: 2025-08-15

## 2025-08-21 ENCOUNTER — TELEPHONE (OUTPATIENT)
Dept: TRANSPLANT | Facility: CLINIC | Age: 70
End: 2025-08-21
Payer: MEDICARE

## 2025-08-29 ENCOUNTER — TELEPHONE (OUTPATIENT)
Dept: TRANSPLANT | Facility: CLINIC | Age: 70
End: 2025-08-29
Payer: MEDICARE